# Patient Record
Sex: FEMALE | Race: BLACK OR AFRICAN AMERICAN
[De-identification: names, ages, dates, MRNs, and addresses within clinical notes are randomized per-mention and may not be internally consistent; named-entity substitution may affect disease eponyms.]

---

## 2017-07-28 ENCOUNTER — HOSPITAL ENCOUNTER (EMERGENCY)
Dept: HOSPITAL 74 - JER | Age: 27
LOS: 1 days | Discharge: HOME | End: 2017-07-29
Payer: COMMERCIAL

## 2017-07-28 VITALS — TEMPERATURE: 98.3 F | DIASTOLIC BLOOD PRESSURE: 58 MMHG | SYSTOLIC BLOOD PRESSURE: 132 MMHG | HEART RATE: 86 BPM

## 2017-07-28 VITALS — BODY MASS INDEX: 32.9 KG/M2

## 2017-07-28 DIAGNOSIS — Z3A.01: ICD-10-CM

## 2017-07-28 DIAGNOSIS — O26.891: Primary | ICD-10-CM

## 2017-07-28 NOTE — PDOC
History of Present Illness





- General


Chief Complaint: Pain


Stated Complaint: CRAMPING/6 WKS PREGNANT


Time Seen by Provider: 17 22:14





- History of Present Illness


Initial Comments: 


 27 year old  female presenting with recent serum pregnancy positive at her 

ObGyn and new abdominal cramps. She had cramps on Wednesday (17)  and 

called into the ED at which point she was instructed to wait to see if the 

cramps would resolve on their own at which point they did until today when she 

had more cramping. She denies vaginal bleeding or any other symptoms. Her LMP 

was 6/15/17. Her previous pregnancy did not co-present with cramps and was only 

notable for need for early induction due to amniotic fluid leakage. The baby 

had a normal developmental history and is currently healthy. She has not had an 

abdominal or trans-vaginal ultrasound.





17 00:49








Past History





- Past Medical History


Allergies/Adverse Reactions: 


 Allergies











Allergy/AdvReac Type Severity Reaction Status Date / Time


 


No Known Allergies Allergy   Verified 17 22:02











Home Medications: 


Ambulatory Orders





NK [No Known Home Medication]  17 








Asthma: No


Cancer: No


Cardiac Disorders: No


Diabetes: No


HTN: No


Seizures: No


Thyroid Disease: No


Other medical history: Pt denies





- Psycho/Social/Smoking Cessation Hx


Suicidal Ideation: No


Smoking Status: No


Smoking History: Never smoked


Number of Cigarettes Smoked Daily: 0


Information on smoking cessation initiated: No


Hx Alcohol Use: No


Drug/Substance Use Hx: No


Substance Use Type: None


Hx Substance Use Treatment: No





**Review of Systems





- Review of Systems


Constitutional: No: Chills, Diaphoresis, Fever, Loss of Appetite


HEENTM: No: Blurred Vision, Double Vision


Respiratory: No: Cough, Orthopnea, Shortness of Breath


Cardiac (ROS): Yes: Other (breast pain).  No: Chest Pain


ABD/GI: Yes: Abdominal Distended, Constipated, Nausea, Abdominal cramping.  No: 

Diarrhea, Poor Appetite, Vomiting, Indigestion


: Yes: Other (Pelvic cramping).  No: Burning, Dysuria, Discharge


Integumentary: Yes: Sweating.  No: Dryness, Erythema, Flushing


Neurological: No: Headache, Numbness, Paresthesia





*Physical Exam





- Vital Signs


 Last Vital Signs











Temp Pulse Resp BP Pulse Ox


 


 98.3 F   86   18   132/58   100 


 


 17 22:02  17 22:02  17 22:02  17 22:02  17 22:02














- Physical Exam


General Appearance: Yes: Appropriately Dressed.  No: Nourished, Apparent 

Distress


HEENT: positive: EOMI, MYCHAL, Normal Voice


Neck: positive: Trachea midline, Normal Thyroid, Supple.  negative: Tender, 

Rigid


Respiratory/Chest: positive: Lungs Clear, Normal Breath Sounds.  negative: 

Chest Tender, Respiratory Distress, Accessory Muscle Use


Cardiovascular: positive: Regular Rhythm, Regular Rate, S1, S2.  negative: 

Murmur


Female Pelvic Exam: positive: normal external exam, cervical os closed, normal 

size ovaries.  negative: normal adnexa (Adnexal tenderness bilaterally), 

discharge, lesions


Gastrointestinal/Abdominal: positive: Normal Bowel Sounds, Tender (Pelvic 

tenderness), Soft, Protuberent, Distended.  negative: Flat, Organomegaly


Musculoskeletal: positive: Normal Inspection


Integumentary: positive: Normal Color, Dry, Warm


Neurologic: positive: Fully Oriented, Alert, Normal Mood/Affect





ED Treatment Course





- LABORATORY


CBC & Chemistry Diagram: 


 17 00:00





 17 00:00





Medical Decision Making





- Medical Decision Making


27 year old female  presenting with isolated abdominal cramping on and off 

for the past 4 days. Will get trans-vaginal ultrasound and quantitative hcg for 

correlation.


17 01:02








17 02:12


Pelvic exam revealed normal appearing external and internal vagina with closed 

cervical os and no discharge. There was some bilateral adnexal tenderness. TV 

US demonstrated gestational sack but no fetal heartbeat noted. BhCG was 3K. 

Patient says she has an OBGyn appointment on Monday. After discussion with the 

patient she was told to make sure to keep her appointment or return to the ED 

if there was an issue. 





*DC/Admit/Observation/Transfer


Diagnosis at time of Disposition: 


 Inlet contraction of pelvis





- Discharge Dispostion


Disposition: HOME


Condition at time of disposition: Improved


Admit: No





- Patient Instructions


Printed Discharge Instructions:  DI for Resolved  Contractions


Additional Instructions: 


YOU were seen for contractions early on in your pregnancy. We did a pelvic exam 

and ultrasound which showed a possible pregnancy but we could not see a heart 

beat so we need you to return in approximately 48 hours or follow up with your 

obgyn physician in order to have another ultrasound performed. Please return 

earlier if you have worsening contractions, vaginal bleeding, or other 

concerning symptoms. 





- Attestations


Physician Attestion: 








I, Dr. Wilfrido Wilks, attest that this document has been prepared under my 

direction and personally reviewed by me in its entirety.   I further attest, 

that it accurately reflects all work, treatment, procedures and medical decision

-making performed by me.  


17 02:18

## 2017-07-28 NOTE — PDOC
Attending Attestation





- Resident


Resident Name: Wilfrido Wilks





- ED Attending Attestation


I have performed the following: I have examined & evaluated the patient, The 

case was reviewed & discussed with the resident, I agree w/resident's findings 

& plan, Exceptions are as noted





- HPI


HPI: 


26 yo  presents with pelvic discomfort. She states her last period was 

about 6 weeks ago. Denies dysuria, f/c, N/V/D. She has not yet seen her gyn for 

this pregnancy, and has not had an ultrasound yet. Denies bleeding. 











- Physicial Exam


PE: 


GENERAL: Awake, alert, and fully oriented, in no acute distress


HEAD: No signs of trauma


EYES: PERRLA, EOMI, sclera anicteric, conjunctiva clear


ENT: Auricles normal inspection, hearing grossly normal, nares patent, 

oropharynx clear without exudates. Moist mucosa


NECK: Normal ROM, supple, no lymphadenopathy, JVD, or masses


LUNGS: Breath sounds equal, clear to auscultation bilaterally.  No wheezes, and 

no crackles


HEART: Regular rate and rhythm, normal S1 and S2, no murmurs, rubs or gallops


ABDOMEN: Soft, nontender, normoactive bowel sounds.  No guarding, no rebound.  

No masses


EXTREMITIES: Normal range of motion, no edema.  No clubbing or cyanosis. No 

cords, erythema, or tenderness


NEUROLOGICAL: Cranial nerves II through XII grossly intact.  Normal speech, 

normal gait


SKIN: Warm, Dry, normal turgor, no rashes or lesions noted.


: No external lesions. +Physiologic discharge. +B/L adnexal tenderness.








- Medical Decision Making


26 yo F presents with pelvic pain, no vag bleeding. Will obtain labs to 

establish B-HCG, then obtain sono to evaluate the pregnancy.

## 2017-07-29 LAB
ALBUMIN SERPL-MCNC: 3.9 G/DL (ref 3.4–5)
ALP SERPL-CCNC: 69 U/L (ref 45–117)
ALT SERPL-CCNC: 39 U/L (ref 12–78)
ANION GAP SERPL CALC-SCNC: 10 MMOL/L (ref 8–16)
APPEARANCE UR: CLEAR
AST SERPL-CCNC: 33 U/L (ref 15–37)
BASOPHILS # BLD: 0.5 % (ref 0–2)
BILIRUB SERPL-MCNC: 0.5 MG/DL (ref 0.2–1)
BILIRUB UR STRIP.AUTO-MCNC: NEGATIVE MG/DL
CALCIUM SERPL-MCNC: 9.4 MG/DL (ref 8.5–10.1)
CO2 SERPL-SCNC: 26 MMOL/L (ref 21–32)
COLOR UR: (no result)
CREAT SERPL-MCNC: 0.6 MG/DL (ref 0.55–1.02)
DEPRECATED RDW RBC AUTO: 14.3 % (ref 11.6–15.6)
EOSINOPHIL # BLD: 1.4 % (ref 0–4.5)
GLUCOSE SERPL-MCNC: 71 MG/DL (ref 74–106)
KETONES UR QL STRIP: NEGATIVE
LEUKOCYTE ESTERASE UR QL STRIP.AUTO: NEGATIVE
MCH RBC QN AUTO: 25.8 PG (ref 25.7–33.7)
MCHC RBC AUTO-ENTMCNC: 32 G/DL (ref 32–36)
MCV RBC: 80.6 FL (ref 80–96)
NEUTROPHILS # BLD: 60.4 % (ref 42.8–82.8)
NITRITE UR QL STRIP: NEGATIVE
PH UR: 6 [PH] (ref 5–8)
PLATELET # BLD AUTO: 292 K/MM3 (ref 134–434)
PMV BLD: 9.3 FL (ref 7.5–11.1)
PROT SERPL-MCNC: 7.5 G/DL (ref 6.4–8.2)
PROT UR QL STRIP: NEGATIVE
PROT UR QL STRIP: NEGATIVE
RBC # BLD AUTO: <1 /HPF (ref 0–3)
RBC # UR STRIP: (no result) /UL
SP GR UR: 1.01 (ref 1–1.02)
UROBILINOGEN UR STRIP-MCNC: NEGATIVE MG/DL (ref 0.2–1)
WBC # BLD AUTO: 13.8 K/MM3 (ref 4–10)
WBC # UR AUTO: <1 /HPF (ref 3–5)

## 2017-08-16 ENCOUNTER — HOSPITAL ENCOUNTER (EMERGENCY)
Dept: HOSPITAL 74 - JER | Age: 27
LOS: 1 days | Discharge: HOME | End: 2017-08-17
Payer: COMMERCIAL

## 2017-08-16 VITALS — DIASTOLIC BLOOD PRESSURE: 72 MMHG | HEART RATE: 76 BPM | SYSTOLIC BLOOD PRESSURE: 122 MMHG | TEMPERATURE: 98.1 F

## 2017-08-16 VITALS — BODY MASS INDEX: 33 KG/M2

## 2017-08-16 DIAGNOSIS — O26.891: Primary | ICD-10-CM

## 2017-08-16 DIAGNOSIS — Z3A.08: ICD-10-CM

## 2017-08-16 DIAGNOSIS — O03.9: ICD-10-CM

## 2017-08-16 LAB
ALBUMIN SERPL-MCNC: 3.7 G/DL (ref 3.4–5)
ALP SERPL-CCNC: 69 U/L (ref 45–117)
ALT SERPL-CCNC: 29 U/L (ref 12–78)
ANION GAP SERPL CALC-SCNC: 7 MMOL/L (ref 8–16)
AST SERPL-CCNC: 23 U/L (ref 15–37)
BASOPHILS # BLD: 0.6 % (ref 0–2)
BILIRUB SERPL-MCNC: 0.4 MG/DL (ref 0.2–1)
CALCIUM SERPL-MCNC: 9.6 MG/DL (ref 8.5–10.1)
CO2 SERPL-SCNC: 26 MMOL/L (ref 21–32)
CREAT SERPL-MCNC: 0.7 MG/DL (ref 0.55–1.02)
DEPRECATED RDW RBC AUTO: 14.2 % (ref 11.6–15.6)
EOSINOPHIL # BLD: 1.7 % (ref 0–4.5)
GLUCOSE SERPL-MCNC: 96 MG/DL (ref 74–106)
MCH RBC QN AUTO: 26 PG (ref 25.7–33.7)
MCHC RBC AUTO-ENTMCNC: 32.9 G/DL (ref 32–36)
MCV RBC: 79 FL (ref 80–96)
NEUTROPHILS # BLD: 62.7 % (ref 42.8–82.8)
PLATELET # BLD AUTO: 334 K/MM3 (ref 134–434)
PMV BLD: 8.5 FL (ref 7.5–11.1)
PROT SERPL-MCNC: 7.3 G/DL (ref 6.4–8.2)
WBC # BLD AUTO: 14.3 K/MM3 (ref 4–10)

## 2017-08-16 NOTE — PDOC
History of Present Illness





- General


History Source: Patient


Exam Limitations: No Limitations





- History of Present Illness


Initial Comments: 





17 21:54


The patient is a 27 year old female (), 8 Weeks pregnant, who presents to 

the ED with complaints of abdominal cramping and vaginal bleeding since earlier 

today. The patient reports she noted brown spotting this morning. She states 

the vaginal bleeding progressively worsened throughout the day to light pink 

spotting and she now sees bright red blood secondary to wiping. Patient also 

reports lower abdominal cramping associated with present symptoms. 


Denies dysuria. Denies fever or chills. Denies nausea, vomiting, or diarrhea. 

Denies any other symptoms. 








<Elma Zaragoza - Last Filed: 17 00:19>





- General


History Source: Patient (ENT no chris fundal ten)





<Reza Garcia - Last Filed: 17 00:28>





- General


Chief Complaint: Vaginal Bleeding


Stated Complaint: VAGINAL BLEEDING


Time Seen by Provider: 17 21:42





Past History





<Elma Zaragoza - Last Filed: 17 00:19>





- Past Medical History


Asthma: No


Cancer: No


Cardiac Disorders: No


Diabetes: No


HTN: No


Seizures: No


Thyroid Disease: No





- Psycho/Social/Smoking Cessation Hx


Suicidal Ideation: No


Smoking Status: No


Smoking History: Never smoked


Have you smoked in the past 12 months: No


Number of Cigarettes Smoked Daily: 0


Information on smoking cessation initiated: No


Hx Alcohol Use: No


Drug/Substance Use Hx: No


Substance Use Type: None


Hx Substance Use Treatment: No





<Reza Garcia - Last Filed: 17 00:28>





- Past Medical History


Allergies/Adverse Reactions: 


 Allergies











Allergy/AdvReac Type Severity Reaction Status Date / Time


 


No Known Allergies Allergy   Verified 17 21:21











Home Medications: 


Ambulatory Orders





NK [No Known Home Medication]  17 











**Review of Systems





- Review of Systems


Able to Perform ROS?: Yes


Comments:: 





17 21:55








CONSTITUTIONAL:


No reported: Fever, Chills, Diaphoresis, Generalized Weakness, Malaise, Loss of 

Appetite


HEENT:


No reported: Rhinorrhea, Nasal Congestion, Throat Pain, Throat Swelling, 

Difficulty Swallowing, Mouth Swelling, Ear Pain, Eye Pain, Visual Changes


CARDIOVASCULAR:


No reported: Chest Pain, Syncope, Palpitations, Irregular Heart Rate, 

Lightheadedness, Peripheral Edema


RESPIRATORY:


No reported: Cough, Shortness of Breath, SOB with Exertion, Orthopnea, Wheezing

, Stridor, Hemoptysis


GASTROINTESTINAL: + abdominal cramping 


No reported: Abdominal Distension, Nausea, Vomiting, Diarrhea, Constipation, 

Melena, Hematochezia


GENITOURINARY: + vaginal bleeding 


No reported: Dysuria, Frequency, Urgency, Hesitancy, Flank Pain, Genital Pain


MUSCULOSKELETAL:


No reported: Myalgia, Arthralgia, Joint Swelling, Back pain, Neck Pain


SKIN:


No reported: Rash, Itching, Pallor


HEMEATOLOGIC/IMMUNOLOGIC:


No reported: Easy Bleeding, Easy Bruising, Lymphadenopathy, Frequent infections


ENDOCRINE:


No reported: Unexplained Weight Gain, Unexplained Weight Loss, Heat Intolerance

, Cold Intolerance


NEUROLOGIC:


No reported: Headache, Focal Weakness, Paresthesias, Vertigo, Lightheadedness, 

Unsteady Gait, Seizure, Mental Status Changes, Incontinence


PSYCHIATRIC:


No reported: Anxiety, Depression 





All Other Systems: Reviewed and Negative





<Elma Zaragoza - Last Filed: 17 00:19>





*Physical Exam





- Vital Signs


 Last Vital Signs











Temp Pulse Resp BP Pulse Ox


 


 98.1 F   76   18   122/72   100 


 


 17 21:18  17 21:18  17 21:18  17 21:18  17 21:18














- Physical Exam


Comments: 





17 21:55





GENERAL:


Well developed, well nourished. Awake and alert. No acute distress.


HEENT:


Normocephalic, atraumatic. PERRLA, EOMI. No conjunctival pallor. Sclera are non-

icteric. Moist mucous membranes. Oropharynx is clear.


NECK: 


Supple. Full ROM. No JVD. Carotid pulses 2+ and symmetric, without bruits. No 

thyromegaly. No lymphadenopathy.


CARDIOVASCULAR:


Regular rate and rhythm. No murmurs, rubs, or gallops. Distal pulses are 2+ and 

symmetric. 


PULMONARY: 


No evidence of respiratory distress. Lungs clear to auscultation bilaterally. 

No wheezing, rales or rhonchi.


ABDOMINAL:


Soft. Non-tender. Non-distended. No rebound or guarding. No organomegaly. 

Normoactive bowel sounds.


PELVIC: 


+ scant brown blood in the vault. No CMT, no masses, no vaginal lesions.  


MUSCULOSKELETAL 


Normal range of motion at all joints. No bony deformities or tenderness. No CVA 

tenderness.


EXTREMITIES: 


No cyanosis. No clubbing. No edema. No calf tenderness.


SKIN: 


Warm and dry. Normal capillary refill. No rashes. No jaundice. 


NEUROLOGICAL: 


Alert, awake, appropriate. Cranial nerves 2-12 intact. No deficits to light 

touch and temperature in face, upper extremities and lower extremities. No 

motor deficits in the in face, upper extremities and lower extremities. 

Normoreflexic in the upper and lower extremities. Normal speech. Toes are down-

going bilaterally. Gait is normal without ataxia.


PSYCHIATRIC: 


Cooperative. Good eye contact. Appropriate mood and affect.








<Elma Zaragoza - Last Filed: 17 00:19>





- Vital Signs


 Last Vital Signs











Temp Pulse Resp BP Pulse Ox


 


 98.1 F   76   18   122/72   100 


 


 17 21:18  17 21:18  17 21:18  17 21:18  17 21:18














<Reza Garcia - Last Filed: 17 00:28>





ED Treatment Course





- LABORATORY


CBC & Chemistry Diagram: 


 17 21:44





 17 21:44





- RADIOLOGY


Radiograph Interpretation: 





17 00:19


EXAM: Obstetric ultrasound first trimester 


FINDINGS: There is an intrauterine gestational sac with irregular margins. 

Measurements correspond to 5 weeks 3 days. No fetal pole is visualized. This 

may represent a failed gestation. Followup recommended to exclude the less 

likely possibility of early normal intrauterine gestation in which the fetal 

pole is not yet visualized. There is some fluid in the cervical canal.  1.4 cm 

right ovarian cyst likely luteal.  1.3 cm left ovarian cyst.  Some free fluid 

in the pelvic cul-de-sac.


Reported by: Imaging on call 





<Elma Zaragoza - Last Filed: 17 00:19>





- LABORATORY


CBC & Chemistry Diagram: 


 17 21:44





 17 21:44





<Reza Garcia - Last Filed: 17 00:28>





Medical Decision Making





- Medical Decision Making





17 00:26


Dr. Garcia: The scribe's documentation has been prepared under my direction 

and personally reviewed by me in its entirery. I confirm that the note above 

accurately reflects all work, treatment, procedures, and medical decision 

making performed by me.





<Reza Garcia - Last Filed: 17 00:28>





*DC/Admit/Observation/Transfer





- Attestations


Scribe Attestion: 





17 21:55





Documentation prepared by Elma aZragoza, acting as medical scribe for Reza Garcia MD





<Elma Zaragoza - Last Filed: 17 00:19>





- Discharge Dispostion


Admit: No





<Reza Garcia - Last Filed: 17 00:28>


Diagnosis at time of Disposition: 


 Inevitable 





- Discharge Dispostion


Disposition: HOME


Condition at time of disposition: Stable





- Referrals


Referrals: 


Charles Urrutia MD [Primary Care Provider] - 


Wolfgang Gage MD [Staff Physician] - 





- Patient Instructions


Printed Discharge Instructions:  DI for Miscarriage, DI for Threatened 


Additional Instructions: 


PLease follow up with your Ob/gyn to follow up the progress of the pregnancy.

## 2018-04-30 ENCOUNTER — HOSPITAL ENCOUNTER (EMERGENCY)
Dept: HOSPITAL 74 - JER | Age: 28
Discharge: HOME | End: 2018-04-30
Payer: COMMERCIAL

## 2018-04-30 VITALS — TEMPERATURE: 98.3 F | SYSTOLIC BLOOD PRESSURE: 116 MMHG | DIASTOLIC BLOOD PRESSURE: 70 MMHG | HEART RATE: 81 BPM

## 2018-04-30 VITALS — BODY MASS INDEX: 31.8 KG/M2

## 2018-04-30 DIAGNOSIS — K05.10: ICD-10-CM

## 2018-04-30 DIAGNOSIS — D57.3: ICD-10-CM

## 2018-04-30 DIAGNOSIS — E28.2: ICD-10-CM

## 2018-04-30 DIAGNOSIS — R07.89: Primary | ICD-10-CM

## 2018-04-30 LAB
ALBUMIN SERPL-MCNC: 3.8 G/DL (ref 3.4–5)
ALP SERPL-CCNC: 72 U/L (ref 45–117)
ALT SERPL-CCNC: 29 U/L (ref 12–78)
ANION GAP SERPL CALC-SCNC: 5 MMOL/L (ref 8–16)
APPEARANCE UR: (no result)
AST SERPL-CCNC: 19 U/L (ref 15–37)
BASOPHILS # BLD: 0.7 % (ref 0–2)
BILIRUB SERPL-MCNC: 0.6 MG/DL (ref 0.2–1)
BILIRUB UR STRIP.AUTO-MCNC: NEGATIVE MG/DL
BUN SERPL-MCNC: 10 MG/DL (ref 7–18)
CALCIUM SERPL-MCNC: 8.9 MG/DL (ref 8.5–10.1)
CHLORIDE SERPL-SCNC: 107 MMOL/L (ref 98–107)
CO2 SERPL-SCNC: 26 MMOL/L (ref 21–32)
COLOR UR: (no result)
CREAT SERPL-MCNC: 0.7 MG/DL (ref 0.55–1.02)
DEPRECATED RDW RBC AUTO: 14.1 % (ref 11.6–15.6)
EOSINOPHIL # BLD: 2.2 % (ref 0–4.5)
EPITH CASTS URNS QL MICRO: (no result) /HPF
GLUCOSE SERPL-MCNC: 89 MG/DL (ref 74–106)
HCT VFR BLD CALC: 33.8 % (ref 32.4–45.2)
HGB BLD-MCNC: 11.5 GM/DL (ref 10.7–15.3)
INR BLD: 1.11 (ref 0.82–1.09)
KETONES UR QL STRIP: NEGATIVE
LEUKOCYTE ESTERASE UR QL STRIP.AUTO: NEGATIVE
LYMPHOCYTES # BLD: 31.6 % (ref 8–40)
MCH RBC QN AUTO: 26.6 PG (ref 25.7–33.7)
MCHC RBC AUTO-ENTMCNC: 34.1 G/DL (ref 32–36)
MCV RBC: 77.9 FL (ref 80–96)
MONOCYTES # BLD AUTO: 7.8 % (ref 3.8–10.2)
MUCOUS THREADS URNS QL MICRO: (no result)
NEUTROPHILS # BLD: 57.7 % (ref 42.8–82.8)
NITRITE UR QL STRIP: NEGATIVE
PH UR: 6 [PH] (ref 5–8)
PLATELET # BLD AUTO: 320 K/MM3 (ref 134–434)
PMV BLD: 8.9 FL (ref 7.5–11.1)
POTASSIUM SERPLBLD-SCNC: 3.9 MMOL/L (ref 3.5–5.1)
PROT SERPL-MCNC: 7.2 G/DL (ref 6.4–8.2)
PROT UR QL STRIP: NEGATIVE
PROT UR QL STRIP: NEGATIVE
PT PNL PPP: 12.5 SEC (ref 9.7–13)
RBC # BLD AUTO: 4.33 M/MM3 (ref 3.6–5.2)
SODIUM SERPL-SCNC: 138 MMOL/L (ref 136–145)
SP GR UR: 1.01 (ref 1–1.03)
UROBILINOGEN UR STRIP-MCNC: 2 MG/DL (ref 0.2–1)
WBC # BLD AUTO: 9.5 K/MM3 (ref 4–10)

## 2018-04-30 PROCEDURE — 3E033GC INTRODUCTION OF OTHER THERAPEUTIC SUBSTANCE INTO PERIPHERAL VEIN, PERCUTANEOUS APPROACH: ICD-10-PCS

## 2018-04-30 NOTE — PDOC
History of Present Illness





- General


Chief Complaint: Chest Pain


Stated Complaint: CHEST PAIN


Time Seen by Provider: 04/30/18 19:50


History Source: Patient


Exam Limitations: No Limitations





- History of Present Illness


Initial Comments: 





04/30/18 20:32


Pt is a 27 yo F with PMHx of polycystic ovary disease, gum dx, SC trait, with 

recent normal Stress test, and recent miscarriage presenting with intermittent 

chest pain since 5.30pm. The pain is intermittent, described as a 10/10 

retrosternal chest tightness, radiating to the neck and throat that was first 

noticed while lying down. The pain came on after a meal. Not related to exertion

, not improved by sitting up, or ingestion of milk. Pt describes SOB when 

climbing stairs, not related to the chest pain. No family hx of early CAD 

death. Mother also recently had a stress test. Pt has had dyspepsia in the past 

that resolved with ingestion of milk. Pt thinks this pain is different from the 

dyspepsia she has had in the past. No fever, cough, syncope, weakness of any 

part of the body or blurring of vision. She noted suprapubic pain, no hematuria 

or dysuria. 





04/30/18 20:42





Timing/Duration: intermittent


Severity: moderate


Associated Symptoms: denies: cough, diaphoresis, fever/chills, loss of appetite

, nausea/vomiting, shortness of breath, syncope, weakness





Past History





- Past Medical History


Allergies/Adverse Reactions: 


 Allergies











Allergy/AdvReac Type Severity Reaction Status Date / Time


 


No Known Allergies Allergy   Verified 04/30/18 19:35











Home Medications: 


Ambulatory Orders





NK [No Known Home Medication]  07/29/17 








Asthma: No


Cancer: No


Cardiac Disorders: No


COPD: No


Diabetes: No


HTN: No


Seizures: No


Thyroid Disease: No





- Reproductive History


LMP comment: 4/17/18





- Immunization History


Immunization Up to Date: Yes





- Suicide/Smoking/Psychosocial Hx


Smoking Status: No


Smoking History: Never smoked


Have you smoked in the past 12 months: No


Number of Cigarettes Smoked Daily: 0


Information on smoking cessation initiated: No


Hx Alcohol Use: No


Drug/Substance Use Hx: No


Substance Use Type: None


Hx Substance Use Treatment: No





**Review of Systems





- Review of Systems


Constitutional: No: Chills, Diaphoresis, Fever, Night Sweats, Weakness


HEENTM: No: Blurred Vision, Throat Pain


Respiratory: Yes: Shortness of Breath (On climbing stairs- negative stress test 

resulted last week).  No: Cough, Orthopnea


Cardiac (ROS): Yes: Chest Tightness.  No: Edema, Lightheadedness, Palpitations, 

Syncope


ABD/GI: No: Abdominal Distended, Nausea, Vomiting


: Yes: Pain (lower abdominal pain).  No: Burning, Dysuria


Musculoskeletal: No: Back Pain, Muscle Pain


Neurological: No: Headache, Numbness, Paresthesia, Seizure, Tremors, Weakness, 

Dizziness





*Physical Exam





- Vital Signs


 Last Vital Signs











Temp Pulse Resp BP Pulse Ox


 


 98.3 F   81   16   116/70   100 


 


 04/30/18 19:32  04/30/18 19:32  04/30/18 19:32  04/30/18 19:32  04/30/18 19:32














- Physical Exam


General Appearance: Yes: Appropriately Dressed.  No: Apparent Distress


HEENT: positive: EOMI, MYCHAL.  negative: Scleral Icterus (L), Pharyngeal Erythema


Neck: positive: Supple.  negative: Tender


Respiratory/Chest: positive: Lungs Clear, Normal Breath Sounds.  negative: 

Respiratory Distress, Accessory Muscle Use, Labored Respiration


Cardiovascular: positive: S1, S2


Gastrointestinal/Abdominal: positive: Soft.  negative: Tender, Distended, 

Guarding


Musculoskeletal: negative: CVA Tenderness


Extremity: positive: Normal Inspection.  negative: Delayed Capillary Refill


Integumentary: positive: Dry, Warm


Neurologic: positive: Fully Oriented, Alert, Motor Strength 5/5.  negative: EOM 

Palsy, Facial Droop, Numbness, Confused, Disoriented





**Heart Score/ECG Review





- Age


Age: </= 45





- Risk Factors


Risk Factors Heart Score: Yes Smoking History, Yes Hx Obesity





- ECG Intrepretation


Rhythm: Regular Rhythm





- Axis


Axis: Normal





- P and MI


Delta Wave(s) Present: No


WPW: No





- QRS


Poor R Wave Progression: No


Q Wave Present: No





- ST and T


Early Repolarization: No


Non Specific ST-T Wave changes: Yes


Flattened T Waves: No


Prolonged Q-T Interval: No





- ECG Impressions


Comment:: 





04/30/18 21:29


Ventricular rate 82bpm, Normal sinus rhythm, possible L atrial enlargement


QT/QTc- 380/443





ED Treatment Course





- LABORATORY


CBC & Chemistry Diagram: 


 04/30/18 19:25





 04/30/18 21:32





Medical Decision Making





- Medical Decision Making





04/30/18 21:31


Chest pain is atypical, not worsened with exertion, no associated diaphoresis, 

or  radiation


Normal appearing EKG, no previous to compare with


With recent normal stress test, ACS is unlikely but will go ahead and do trops, 

CBC, CMP, Mg, UA, beta hcg, coags


04/30/18 21:34


UA- negative for UTI


WBC- wnl, H&H - 11 within normal for her with microcytic picture consistent 

with sickle cell trait history


Pending hcg


Would put in for chest xray- patient reported pain radiating to back


04/30/18 23:00


Trops-ve


CXR- no evidence of dissection, no unfolding of the aorta


Plan is to discharge home to follow up with PCP as an outpatient








*DC/Admit/Observation/Transfer


Diagnosis at time of Disposition: 


 Atypical chest pain








- Discharge Dispostion


Disposition: HOME


Condition at time of disposition: Stable


Admit: No





- Referrals


Referrals: 


Charles Urrutia MD [Primary Care Provider] - 





- Patient Instructions


Additional Instructions: 


You were seen for tightness in your chest


We did an EKG and blood tests, Chest Xray  that did not show evidence of a 

heart attack or problems with your aorta


We gave you some medication to ease the chest tightness


Please follow up with your primary doctor in a week's time


If you feel your symptoms are getting worse, and you are unable to breath, 

please return to the Emergency room





- Post Discharge Activity





- Attestations


Physician Attestion: 





04/30/18 23:06


Mari Geiger MD

## 2018-04-30 NOTE — PDOC
Attending Attestation





- Resident


Resident Name: Mari Geiger





- ED Attending Attestation


I have performed the following: I have examined & evaluated the patient, The 

case was reviewed & discussed with the resident, I agree w/resident's findings 

& plan, Exceptions are as noted





- HPI


HPI: 





04/30/18 21:11


27 yo female who came with chest tightness


-she had a recent stress echo on April 2 that was normal 


04/30/18 21:13


she does have a history of GERD 





- Physicial Exam


PE: 





04/30/18 21:14


27 yo female in no acute distress


head -ncat


neck -no jvd,no bruits


lungs -cta b/l


cvs- [id number] no rubs,murmurs or gallops


abd -soft,nontender


ext -no edema


muscularskeletal -no cva tenderness


neuro axox3,ambulating with ease


skin warm and dry


psych [id number]





- Medical Decision Making





04/30/18 22:41


troponin pending


04/30/18 23:03


negative troponin


ekg is nsr with no signs of ischemia


04/30/18 23:06


cxr normal medistinum


04/30/18 23:14








IMP GERD, pt encouraged to continue her PPI

## 2018-05-02 NOTE — EKG
Test Reason : 

Blood Pressure : ***/*** mmHG

Vent. Rate : 082 BPM     Atrial Rate : 082 BPM

   P-R Int : 180 ms          QRS Dur : 080 ms

    QT Int : 380 ms       P-R-T Axes : 045 063 040 degrees

   QTc Int : 443 ms

 

NORMAL SINUS RHYTHM

POSSIBLE LEFT ATRIAL ENLARGEMENT

CANNOT RULE OUT ANTERIOR INFARCT , AGE UNDETERMINED

ABNORMAL ECG

NO PREVIOUS ECGS AVAILABLE

Confirmed by MARY GARCIA MD ([id number]) on 5/2/2018 10:35:53 AM

 

Referred By:             Confirmed By:MARY GARCIA MD

## 2019-04-28 ENCOUNTER — HOSPITAL ENCOUNTER (INPATIENT)
Dept: HOSPITAL 74 - JLDR | Age: 29
LOS: 2 days | Discharge: HOME | DRG: 560 | End: 2019-04-30
Attending: OBSTETRICS & GYNECOLOGY | Admitting: OBSTETRICS & GYNECOLOGY
Payer: COMMERCIAL

## 2019-04-28 VITALS — BODY MASS INDEX: 34.2 KG/M2

## 2019-04-28 DIAGNOSIS — Z3A.37: ICD-10-CM

## 2019-04-28 DIAGNOSIS — D57.3: ICD-10-CM

## 2019-04-28 DIAGNOSIS — O34.211: Primary | ICD-10-CM

## 2019-04-28 DIAGNOSIS — E28.2: ICD-10-CM

## 2019-04-28 DIAGNOSIS — O99.013: ICD-10-CM

## 2019-04-28 DIAGNOSIS — O99.283: ICD-10-CM

## 2019-04-28 LAB
ANION GAP SERPL CALC-SCNC: 8 MMOL/L (ref 8–16)
APTT BLD: 29.2 SECONDS (ref 25.2–36.5)
BASOPHILS # BLD: 0.3 % (ref 0–2)
BUN SERPL-MCNC: 4 MG/DL (ref 7–18)
CALCIUM SERPL-MCNC: 9.6 MG/DL (ref 8.5–10.1)
CHLORIDE SERPL-SCNC: 104 MMOL/L (ref 98–107)
CO2 SERPL-SCNC: 23 MMOL/L (ref 21–32)
CREAT SERPL-MCNC: 0.5 MG/DL (ref 0.55–1.3)
DEPRECATED RDW RBC AUTO: 15.6 % (ref 11.6–15.6)
EOSINOPHIL # BLD: 0.4 % (ref 0–4.5)
GLUCOSE SERPL-MCNC: 85 MG/DL (ref 74–106)
HCT VFR BLD CALC: 38.2 % (ref 32.4–45.2)
HGB BLD-MCNC: 12.5 GM/DL (ref 10.7–15.3)
INR BLD: 0.97 (ref 0.83–1.09)
LYMPHOCYTES # BLD: 14.5 % (ref 8–40)
MCH RBC QN AUTO: 26.1 PG (ref 25.7–33.7)
MCHC RBC AUTO-ENTMCNC: 32.6 G/DL (ref 32–36)
MCV RBC: 80.1 FL (ref 80–96)
MONOCYTES # BLD AUTO: 9.6 % (ref 3.8–10.2)
NEUTROPHILS # BLD: 75.2 % (ref 42.8–82.8)
PLATELET # BLD AUTO: 211 K/MM3 (ref 134–434)
PMV BLD: 8.9 FL (ref 7.5–11.1)
POTASSIUM SERPLBLD-SCNC: 4.2 MMOL/L (ref 3.5–5.1)
PT PNL PPP: 11.5 SEC (ref 9.7–13)
RBC # BLD AUTO: 4.77 M/MM3 (ref 3.6–5.2)
SODIUM SERPL-SCNC: 135 MMOL/L (ref 136–145)
WBC # BLD AUTO: 11.5 K/MM3 (ref 4–10)

## 2019-04-28 RX ADMIN — BUPIVACAINE HYDROCHLORIDE SCH ML: 7.5 INJECTION, SOLUTION EPIDURAL; RETROBULBAR at 12:05

## 2019-04-28 RX ADMIN — IBUPROFEN PRN MG: 600 TABLET, FILM COATED ORAL at 18:45

## 2019-04-28 NOTE — PN
Ante-Partal Exam





- Subjective


Subjective: 





Pt feeling lower abdominal pain. 


Vital Signs: 


 Vital Signs











Temperature  98.2 F   04/28/19 16:00


 


Pulse Rate  126 H  04/28/19 16:15


 


Respiratory Rate  20   04/28/19 16:15


 


Blood Pressure  150/82   04/28/19 16:15


 


O2 Sat by Pulse Oximetry (%)  99   04/28/19 16:15











Bleeding: Yes


Bleeding Description: Mild


Headache: No


Visual changes: No


Right upper quadrant pain: No


Pain (scale 1-10): 3





- Contractions


Contractions: Yes


Regularity: Regular





- Exam during Labor


Fetal Heart Rate: 155


Variability: Moderate


Category: I


Fetal Monitor Accelerations: Absent


Fetal Monitor Decelerations: None


Exam: Vaginal


Dilatation (cm): 9.5


Effacement (%): 100


Amniotic Membrane Status: Ruptured


Fetal Presentation: Vertex


Fetal Station: 0





- Assessment/Plan


Assessment/Plan: 





to begin pushing

## 2019-04-28 NOTE — LDN
Oxytocin Pre-Use Checklist


Date and Time completed: 


19  0285





Physician order on chart: Yes


Current history and physical on chart: Yes


Indication for induction is documented: Yes


Prenatal record on chart: Yes


Pelvis is documented by physician to be clinically adequate: Yes


Estimated fetal weight within past week (clinical or sono): Less than 4500 

grams in a non-diabetic woman


Gestational age is documented: Yes


Consent signed: Yes


Physician with  privileges: is aware of the induction


Status of the cervix is assessed and documented: Yes


Presentation is assessed and documented: Yes

## 2019-04-28 NOTE — PN
Ante-Partal Exam





- Subjective


Subjective: 





Pt feeling some contraction pain.  


Vital Signs: 


 Vital Signs











Temperature  98.5 F   04/28/19 14:59


 


Pulse Rate  102 H  04/28/19 13:50


 


Respiratory Rate  20   04/28/19 13:50


 


Blood Pressure  130/81   04/28/19 13:50


 


O2 Sat by Pulse Oximetry (%)  100   04/28/19 13:50











Bleeding: Yes


Bleeding Description: Mild (bloody show)


Headache: No


Visual changes: No


Right upper quadrant pain: No





- Contractions


Contractions: Yes


Regularity: Regular


Intensity: Mod/Strong





- Exam during Labor


Fetal Heart Rate: 150


Variability: Moderate


Category: I


Fetal Monitor Accelerations: Present


Fetal Monitor Decelerations: None


Exam: Vaginal


Dilatation (cm): 7


Effacement (%): 90


Amniotic Membrane Status: Ruptured


Fetal Presentation: Vertex


Fetal Station: 0





- Assessment/Plan


Assessment/Plan: 





Continue current care

## 2019-04-28 NOTE — PN
Delivery





- Delivery


Vaginal Delivery: No Problems


Type of Anesthesia: Epidural


Episiotomy/Laceration: None


EBL (cc): 300





Delivery, Single Birth





- Stages of Labor


Date of Delivery: 19


Date Placenta Delivered: 19


Placenta: Yes: Spontaneous





- Condition of Infant


Pediatrician/Neonatologist Present: No


Infant Gender: Female


Position: Left, OA





-  Feeding Plan


Initial Plan: Exclusive breastfeeding throughout hospitalization





Remarks





- Remarks


Remarks: 





Uncomplicated  of baby girl from JEFFRY position across intact perineum


nuchal cord X 1 noted after delivery of fetal head, reduced at perineum


anterior shoulder (right) delivered with ease along with remainder of 


3vc noted, clamped and cut


placenta delivered in tact spontaneously


no laceration appreciated


sponge count correct


mom stable


baby to well baby nursery

## 2019-04-28 NOTE — HP
Past Medical History





- Admission


Chief Complaint: Painful contractions


History of Present Illness: 





28 y/o P1 female with SIUP at 37+ weeks gestation here with complaints of 

painful contractions since yesterday.  No LOF/VB.  +FM.  Pregnancy complicated 

by pt having AS blood type (sickle cell trait) - FOB also reported positive, 

[facility] done this pregnancy and normal XX fetus with normal blood type noted.  Pt 

with h/o PCOS, obesity, otherwise no other medical conditions. 





Pt has h/o primary  for oligohydramnio and arrest of dilation.


History Source: Medical Record


Limitations to Obtaining History: No Limitations





- Past Medical History


Cardiovascular: No: AFIB, HTN


Pulmonary: No: COPD


Gastrointestinal: No: GERD


Hepatobiliary: No: Hepatitis A, Hepatitis C


Reproductive: Yes: Polycystic Ovary Syndrome


...: 3


...Para: 1


...Term: 1


Heme/Onc: No: Anemia


Infectious Disease: No: HIV, MRSA, STD's


Psych: No: Anxiety, Bipolar, Depression





- Past Surgical History


Past Surgical History: Yes: 


Hx Myomectomy: No


Hx Transabdominal Cerclage: No





- Smoking History


Smoking history: Never smoked


Have you smoked in the past 12 months: No


Aproximately how many cigarettes per day: 0





- Alcohol/Substance Use


Hx Alcohol Use: No





- Social History


Usual Living Arrangement: Yes: With Spouse


ADL: Independent


History of Recent Travel: No





Home Medications





- Allergies


Allergies/Adverse Reactions: 


 Allergies











Allergy/AdvReac Type Severity Reaction Status Date / Time


 


No Known Allergies Allergy   Verified 19 13:57














- Home Medications


Home Medications: 


Ambulatory Orders





Prenatal Vit 108/Iron/Folic AC [Prenatal One Tablet] 1 tab PO DAILY 19 











Review of Systems





- Review of Systems


Constitutional: reports: No Symptoms


Eyes: reports: No Symptoms


HENT: reports: No Symptoms


Neck: reports: No Symptoms


Cardiovascular: reports: No Symptoms


Respiratory: reports: No Symptoms


Gastrointestinal: reports: Abdominal Pain (with contractions)


Genitourinary: denies: Vaginal Bleeding


Breasts: reports: No Symptoms Reported


Musculoskeletal: reports: No Symptoms


Integumentary: reports: No Symptoms


Neurological: reports: No Symptoms


Endocrine: reports: No Symptoms


Hematology/Lymphatic: reports: No Symptoms


Psychiatric: reports: No Symptoms





Physical Exam - Maternity


Constitutional: Yes: Well Nourished, Calm, Mild Distress


Eyes: Yes: EOM Intact


HENT: Yes: Atraumatic


Neck: Yes: Supple


Cardiovascular: Yes: Regular Rate and Rhythm


Lungs: Clear to auscultation


Breast(s): Yes: WNL





- Abdominal Exam/OB


Fundal Height: 38


Number of Fetuses: Single


Fetal Presentation: Vertex


Contractions: Yes


Regularity: Irregular


Intensity: Moderate


Fetal Monitor Mode: External


Fetal Heart Rate (range): 150


Category: I


Accelerations: Uniform


Decelerations: Variable (single isolated)





- Vaginal Exam/OB


Vaginal Bleediing: No


Dilatation (cm): 4.5


Effacement (%): 100


Amniotic Membrane Status: Bulging


Fetal Presentation: Vertex/Position


Fetal Station: -1





- Physical Exam


Psychiatric: Yes: Alert, Oriented





Hemorrhage Risk Assessment





- Risk Factors


Medium Risk Factors: Yes: Prior , uterine surgery,or multiple 

laparotomies


High Risk Factors: Yes: None


Risk Score: 1


Risk Level: Medium Risk





Problem List





- Problems


(1) Uterine scar from previous  delivery


Code(s): O34.219 - MATERNAL CARE FOR UNSP TYPE SCAR FROM PREVIOUS  DEL 

  





(2) Active labor at term


Code(s): XZW7199 -    





Assessment/Plan





28 y/o with SIUP at 37+ weeks, labor, for TOLAC


FHT scat 1


admit to L&D


for epidural, AROM and possibly pitocin


GBs unknown, results not back yet (collected last week in office) will 

prophylactically treat

## 2019-04-29 VITALS — TEMPERATURE: 98.2 F

## 2019-04-29 LAB
BASOPHILS # BLD: 0.2 % (ref 0–2)
DEPRECATED RDW RBC AUTO: 15.6 % (ref 11.6–15.6)
EOSINOPHIL # BLD: 0.4 % (ref 0–4.5)
HCT VFR BLD CALC: 35 % (ref 32.4–45.2)
HGB BLD-MCNC: 11.7 GM/DL (ref 10.7–15.3)
LYMPHOCYTES # BLD: 13 % (ref 8–40)
MCH RBC QN AUTO: 27 PG (ref 25.7–33.7)
MCHC RBC AUTO-ENTMCNC: 33.5 G/DL (ref 32–36)
MCV RBC: 80.5 FL (ref 80–96)
MONOCYTES # BLD AUTO: 7.4 % (ref 3.8–10.2)
NEUTROPHILS # BLD: 79 % (ref 42.8–82.8)
PLATELET # BLD AUTO: 177 K/MM3 (ref 134–434)
PMV BLD: 9.2 FL (ref 7.5–11.1)
RBC # BLD AUTO: 4.35 M/MM3 (ref 3.6–5.2)
WBC # BLD AUTO: 13.3 K/MM3 (ref 4–10)

## 2019-04-29 RX ADMIN — FERROUS SULFATE TAB EC 324 MG (65 MG FE EQUIVALENT) SCH MG: 324 (65 FE) TABLET DELAYED RESPONSE at 11:57

## 2019-04-29 RX ADMIN — IBUPROFEN PRN MG: 600 TABLET, FILM COATED ORAL at 11:57

## 2019-04-29 RX ADMIN — ACETAMINOPHEN PRN MG: 325 TABLET ORAL at 21:26

## 2019-04-29 RX ADMIN — FERROUS SULFATE TAB EC 324 MG (65 MG FE EQUIVALENT) SCH MG: 324 (65 FE) TABLET DELAYED RESPONSE at 17:04

## 2019-04-29 RX ADMIN — FERROUS SULFATE TAB EC 324 MG (65 MG FE EQUIVALENT) SCH MG: 324 (65 FE) TABLET DELAYED RESPONSE at 09:00

## 2019-04-29 RX ADMIN — IBUPROFEN PRN MG: 600 TABLET, FILM COATED ORAL at 21:26

## 2019-04-29 RX ADMIN — ACETAMINOPHEN PRN MG: 325 TABLET ORAL at 11:58

## 2019-04-29 RX ADMIN — Medication SCH TAB: at 09:49

## 2019-04-29 RX ADMIN — ACETAMINOPHEN PRN MG: 325 TABLET ORAL at 05:20

## 2019-04-29 RX ADMIN — IBUPROFEN PRN MG: 600 TABLET, FILM COATED ORAL at 05:19

## 2019-04-29 NOTE — PN
Post Partum Progress Note





- Subjective


Subjective: 





Came to evaluate pt, sleeping upon my arrival.  NO acute events overnight per 

nursing staff. 


Type of Delivery: 


Vital Signs: 


 Vital Signs











Temperature  98.5 F   19 08:31


 


Pulse Rate  101 H  19 08:31


 


Respiratory Rate  18   19 08:31


 


Blood Pressure  129/88   19 08:31


 


O2 Sat by Pulse Oximetry (%)  100   19 19:30











Breast Exam: Yes: Soft


Uterus: Yes: Fundus Firm


Abdomen/GI: Yes: Abdomen soft


Lochia, amount: Small


Extremities: Yes: Calves non-tender.  No: Edema


Perineum: Yes: Intact


Activity: Ambulating





- Labs


Labs: 


 CBC











WBC  13.3 K/mm3 (4.0-10.0)  H  19  06:50    


 


RBC  4.35 M/mm3 (3.60-5.2)   19  06:50    


 


Hgb  11.7 GM/dL (10.7-15.3)   19  06:50    


 


Hct  35.0 % (32.4-45.2)   19  06:50    


 


MCV  80.5 fl (80-96)   19  06:50    


 


MCH  27.0 pg (25.7-33.7)   19  06:50    


 


MCHC  33.5 g/dl (32.0-36.0)   19  06:50    


 


RDW  15.6 % (11.6-15.6)   19  06:50    


 


Plt Count  177 K/MM3 (134-434)   19  06:50    


 


MPV  9.2 fl (7.5-11.1)   19  06:50    


 


Absolute Neuts (auto)  10.5 K/mm3 (1.5-8.0)  H  19  06:50    


 


Neutrophils %  79.0 % (42.8-82.8)   19  06:50    


 


Lymphocytes %  13.0 % (8-40)   19  06:50    


 


Monocytes %  7.4 % (3.8-10.2)   19  06:50    


 


Eosinophils %  0.4 % (0-4.5)   19  06:50    


 


Basophils %  0.2 % (0-2.0)   19  06:50    


 


Nucleated RBC %  0 % (0-0)   19  06:50    














Problem List





- Problems


(1) Uterine scar from previous  delivery


Code(s): O34.219 - MATERNAL CARE FOR UNSP TYPE SCAR FROM PREVIOUS  DEL 

  





(2) Active labor at term


Code(s): XCB9090 -    





(3) Vaginal birth after  ()


Code(s): O34.219 - MATERNAL CARE FOR UNSP TYPE SCAR FROM PREVIOUS  DEL 

  





Assessment/Plan





Pt doing well


Regular diet


encourage ambulation


PO pain meds


Hgb 11.7


routine care

## 2019-04-30 VITALS — SYSTOLIC BLOOD PRESSURE: 136 MMHG | HEART RATE: 83 BPM | DIASTOLIC BLOOD PRESSURE: 88 MMHG

## 2019-04-30 RX ADMIN — IBUPROFEN PRN MG: 600 TABLET, FILM COATED ORAL at 07:37

## 2019-04-30 RX ADMIN — FERROUS SULFATE TAB EC 324 MG (65 MG FE EQUIVALENT) SCH MG: 324 (65 FE) TABLET DELAYED RESPONSE at 12:49

## 2019-04-30 RX ADMIN — ACETAMINOPHEN PRN MG: 325 TABLET ORAL at 07:36

## 2019-04-30 RX ADMIN — BUPIVACAINE HYDROCHLORIDE SCH: 7.5 INJECTION, SOLUTION EPIDURAL; RETROBULBAR at 07:08

## 2019-04-30 RX ADMIN — FERROUS SULFATE TAB EC 324 MG (65 MG FE EQUIVALENT) SCH MG: 324 (65 FE) TABLET DELAYED RESPONSE at 07:36

## 2019-04-30 RX ADMIN — Medication SCH TAB: at 09:58

## 2019-04-30 NOTE — DS
Physical Exam-GYN


Vital Signs: 


 Vital Signs











Temperature  98.2 F   19 21:29


 


Pulse Rate  100 H  19 21:29


 


Respiratory Rate  20   19 21:29


 


Blood Pressure  127/76   19 21:29


 


O2 Sat by Pulse Oximetry (%)  100   19 19:30











Labs: 


 CBC, BMP





 19 06:50 





 19 11:25 











Delivery





- Delivery


Vaginal Delivery: No Problems


Type of Anesthesia: Epidural


Episiotomy/Laceration: None


EBL (cc): 300





Delivery, Single Birth





- Stages of Labor


Date 1st Stage Initiatied: 19


Time 1st Stage Initiated: 23:30


Date 2nd Stage Initiated: 19


Time 2nd Stage Initiated: 17:30


Date of Delivery: 19


Time of Delivery: 17:46


Time Placenta Delivered: 17:53


Placenta: Yes: Spontaneous





- Condition of Infant


Pediatrician/Neonatologist Present: No


Infant Gender: Female


Birth Weight: 6 lb 7 oz


Position: Left, OA


Total Hours ROM (Hrs/Mins): 5hr 36min





- Apgar


  ** 1 Minute


Apgar Total Score: 9





  ** 5 Minutes


Apgar Total Score: 9





-  Feeding Plan


Initial Plan: Exclusive breastfeeding throughout hospitalization





Discharge Summary


Reason For Visit: LABOR


Current Active Problems





Active labor at term (Acute)


Uterine scar from previous  delivery (Acute)


Vaginal birth after  () (Acute)








Hospital Course: 





PT admitted on  in labor, has h/o prior  delivery, desired TOLAC.  

She underwent an uncomplicated / on that same date.  She had an 

uncomplicated post partum recovery and was discharged home on post partum day 

2. 


Condition: Good





- Instructions


Diet, Activity, Other Instructions: 





Physical activity





Resume your normal everyday activity as tolerated no heavy lifting or strenuous 

exercise until seen by your doctor. You may walk unlimited amounts and climb 

stairs. You may resume driving the car when you feel safe and comfortable 

behind the wheel. No sexual activity as instructed for 6 weeks.  You may shower 

daily, no soaking in tubs/baths/pools for 4 weeks. 





Diet


There are no dietary restrictions. Eat healthy, high-fiber foods. Drink 6 to 8 

glasses of liquid each day. This will assist in keeping your bowels are regular.





Pain management


You may take Tylenol  or Ibuprofen (for example, Motrin, Advil etc.) as needed 

for pain. 





Call MD for any of the following:





Severe pain not relieved by medication


Fever of 101 or higher


Excessive bleeding or drainage on dressing


Inability to urinate


Referrals: 


Geetha Weiss DO [Staff Physician] -  (6 weeks)


Disposition: HOME





- Home Medications


Comprehensive Discharge Medication List: 


Ambulatory Orders





Prenatal Vit 108/Iron/Folic AC [Prenatal One Tablet] 1 tab PO DAILY 19

## 2021-03-29 ENCOUNTER — HOSPITAL ENCOUNTER (INPATIENT)
Dept: HOSPITAL 74 - JLDR | Age: 31
LOS: 3 days | Discharge: HOME | DRG: 540 | End: 2021-04-01
Attending: OBSTETRICS & GYNECOLOGY | Admitting: OBSTETRICS & GYNECOLOGY
Payer: COMMERCIAL

## 2021-03-29 VITALS — BODY MASS INDEX: 34.5 KG/M2

## 2021-03-29 DIAGNOSIS — O36.5930: ICD-10-CM

## 2021-03-29 DIAGNOSIS — L29.8: ICD-10-CM

## 2021-03-29 DIAGNOSIS — O34.219: ICD-10-CM

## 2021-03-29 DIAGNOSIS — Z3A.37: ICD-10-CM

## 2021-03-29 DIAGNOSIS — O41.03X0: Primary | ICD-10-CM

## 2021-03-29 RX ADMIN — ACETAMINOPHEN PRN MG: 10 INJECTION, SOLUTION INTRAVENOUS at 20:04

## 2021-03-29 RX ADMIN — ACETAMINOPHEN PRN MG: 10 INJECTION, SOLUTION INTRAVENOUS at 12:45

## 2021-03-30 LAB
BASOPHILS # BLD: 0.2 % (ref 0–2)
DEPRECATED RDW RBC AUTO: 15.8 % (ref 11.6–15.6)
EOSINOPHIL # BLD: 1.2 % (ref 0–4.5)
HCT VFR BLD CALC: 32 % (ref 32.4–45.2)
HGB BLD-MCNC: 10.6 GM/DL (ref 10.7–15.3)
LYMPHOCYTES # BLD: 13.8 % (ref 8–40)
MCH RBC QN AUTO: 26.9 PG (ref 25.7–33.7)
MCHC RBC AUTO-ENTMCNC: 33.2 G/DL (ref 32–36)
MCV RBC: 81.2 FL (ref 80–96)
MONOCYTES # BLD AUTO: 6.9 % (ref 3.8–10.2)
NEUTROPHILS # BLD: 77.9 % (ref 42.8–82.8)
PLATELET # BLD AUTO: 177 K/MM3 (ref 134–434)
PMV BLD: 9.4 FL (ref 7.5–11.1)
RBC # BLD AUTO: 3.95 M/MM3 (ref 3.6–5.2)
WBC # BLD AUTO: 10.1 K/MM3 (ref 4–10)

## 2021-03-30 RX ADMIN — ACETAMINOPHEN PRN MG: 325 TABLET ORAL at 16:37

## 2021-03-30 RX ADMIN — ACETAMINOPHEN PRN MG: 10 INJECTION, SOLUTION INTRAVENOUS at 07:52

## 2021-03-30 RX ADMIN — IBUPROFEN PRN MG: 600 TABLET, FILM COATED ORAL at 16:37

## 2021-03-30 RX ADMIN — IBUPROFEN PRN MG: 600 TABLET, FILM COATED ORAL at 11:49

## 2021-03-30 RX ADMIN — DIPHENHYDRAMINE HCL PRN MG: 25 CAPSULE ORAL at 16:37

## 2021-03-30 RX ADMIN — SIMETHICONE CHEW TAB 80 MG PRN MG: 80 TABLET ORAL at 21:17

## 2021-03-30 RX ADMIN — IBUPROFEN PRN MG: 600 TABLET, FILM COATED ORAL at 21:18

## 2021-03-30 RX ADMIN — DIPHENHYDRAMINE HCL PRN MG: 25 CAPSULE ORAL at 10:22

## 2021-03-30 RX ADMIN — DOCUSATE SODIUM,SENNOSIDES PRN TABLET: 50; 8.6 TABLET, FILM COATED ORAL at 21:17

## 2021-03-30 RX ADMIN — Medication SCH TAB: at 10:22

## 2021-03-30 RX ADMIN — ACETAMINOPHEN PRN MG: 325 TABLET ORAL at 21:18

## 2021-03-31 RX ADMIN — IBUPROFEN PRN MG: 600 TABLET, FILM COATED ORAL at 20:24

## 2021-03-31 RX ADMIN — IBUPROFEN PRN MG: 600 TABLET, FILM COATED ORAL at 03:27

## 2021-03-31 RX ADMIN — ACETAMINOPHEN PRN MG: 325 TABLET ORAL at 03:27

## 2021-03-31 RX ADMIN — SIMETHICONE CHEW TAB 80 MG PRN MG: 80 TABLET ORAL at 07:20

## 2021-03-31 RX ADMIN — SIMETHICONE CHEW TAB 80 MG PRN MG: 80 TABLET ORAL at 20:23

## 2021-03-31 RX ADMIN — ACETAMINOPHEN PRN MG: 325 TABLET ORAL at 09:40

## 2021-03-31 RX ADMIN — DOCUSATE SODIUM,SENNOSIDES PRN TABLET: 50; 8.6 TABLET, FILM COATED ORAL at 20:25

## 2021-03-31 RX ADMIN — IBUPROFEN PRN MG: 600 TABLET, FILM COATED ORAL at 15:08

## 2021-03-31 RX ADMIN — SIMETHICONE CHEW TAB 80 MG PRN MG: 80 TABLET ORAL at 03:28

## 2021-03-31 RX ADMIN — IBUPROFEN PRN MG: 600 TABLET, FILM COATED ORAL at 07:20

## 2021-03-31 RX ADMIN — SIMETHICONE CHEW TAB 80 MG PRN MG: 80 TABLET ORAL at 15:07

## 2021-03-31 RX ADMIN — Medication SCH TAB: at 09:38

## 2021-03-31 RX ADMIN — ACETAMINOPHEN PRN MG: 325 TABLET ORAL at 15:09

## 2021-03-31 RX ADMIN — ACETAMINOPHEN PRN MG: 325 TABLET ORAL at 20:23

## 2021-04-01 VITALS — DIASTOLIC BLOOD PRESSURE: 74 MMHG | HEART RATE: 97 BPM | SYSTOLIC BLOOD PRESSURE: 136 MMHG | TEMPERATURE: 97.9 F

## 2021-04-01 RX ADMIN — ACETAMINOPHEN PRN MG: 325 TABLET ORAL at 01:21

## 2021-04-01 RX ADMIN — SIMETHICONE CHEW TAB 80 MG PRN MG: 80 TABLET ORAL at 13:43

## 2021-04-01 RX ADMIN — ACETAMINOPHEN PRN MG: 325 TABLET ORAL at 13:43

## 2021-04-01 RX ADMIN — IBUPROFEN PRN MG: 600 TABLET, FILM COATED ORAL at 07:40

## 2021-04-01 RX ADMIN — SIMETHICONE CHEW TAB 80 MG PRN MG: 80 TABLET ORAL at 07:41

## 2021-04-01 RX ADMIN — IBUPROFEN PRN MG: 600 TABLET, FILM COATED ORAL at 13:43

## 2021-04-01 RX ADMIN — Medication SCH TAB: at 10:25

## 2021-04-01 RX ADMIN — IBUPROFEN PRN MG: 600 TABLET, FILM COATED ORAL at 01:21

## 2021-04-01 RX ADMIN — ACETAMINOPHEN PRN MG: 325 TABLET ORAL at 07:41

## 2021-05-14 ENCOUNTER — HOSPITAL ENCOUNTER (EMERGENCY)
Dept: HOSPITAL 74 - JER | Age: 31
Discharge: HOME | End: 2021-05-14
Payer: COMMERCIAL

## 2021-05-14 VITALS — DIASTOLIC BLOOD PRESSURE: 75 MMHG | SYSTOLIC BLOOD PRESSURE: 139 MMHG | HEART RATE: 71 BPM

## 2021-05-14 VITALS — TEMPERATURE: 98.4 F

## 2021-05-14 VITALS — BODY MASS INDEX: 29.9 KG/M2

## 2021-05-14 DIAGNOSIS — R10.13: Primary | ICD-10-CM

## 2021-05-14 LAB
ALBUMIN SERPL-MCNC: 3.8 G/DL (ref 3.4–5)
ALP SERPL-CCNC: 114 U/L (ref 45–117)
ALT SERPL-CCNC: 23 U/L (ref 13–61)
ANION GAP SERPL CALC-SCNC: 5 MMOL/L (ref 8–16)
APPEARANCE UR: CLEAR
APTT BLD: 29.2 SECONDS (ref 25.2–36.5)
AST SERPL-CCNC: 25 U/L (ref 15–37)
BACTERIA # UR AUTO: 137 /UL (ref 0–1359)
BASOPHILS # BLD: 0.7 % (ref 0–2)
BILIRUB SERPL-MCNC: 0.6 MG/DL (ref 0.2–1)
BILIRUB UR STRIP.AUTO-MCNC: NEGATIVE MG/DL
BUN SERPL-MCNC: 12 MG/DL (ref 7–18)
CALCIUM SERPL-MCNC: 9.1 MG/DL (ref 8.5–10.1)
CASTS URNS QL MICRO: 0 /UL (ref 0–3.1)
CHLORIDE SERPL-SCNC: 106 MMOL/L (ref 98–107)
CO2 SERPL-SCNC: 29 MMOL/L (ref 21–32)
COLOR UR: YELLOW
CREAT SERPL-MCNC: 0.7 MG/DL (ref 0.55–1.3)
DEPRECATED RDW RBC AUTO: 15.2 % (ref 11.6–15.6)
EOSINOPHIL # BLD: 1.8 % (ref 0–4.5)
EPITH CASTS URNS QL MICRO: 7 /UL (ref 0–25.1)
GLUCOSE SERPL-MCNC: 86 MG/DL (ref 74–106)
HCT VFR BLD CALC: 37.2 % (ref 32.4–45.2)
HGB BLD-MCNC: 12.3 GM/DL (ref 10.7–15.3)
INR BLD: 1 (ref 0.83–1.09)
KETONES UR QL STRIP: NEGATIVE
LEUKOCYTE ESTERASE UR QL STRIP.AUTO: NEGATIVE
LIPASE SERPL-CCNC: 128 U/L (ref 73–393)
LYMPHOCYTES # BLD: 30.9 % (ref 8–40)
MAGNESIUM SERPL-MCNC: 1.9 MG/DL (ref 1.8–2.4)
MCH RBC QN AUTO: 25.8 PG (ref 25.7–33.7)
MCHC RBC AUTO-ENTMCNC: 33 G/DL (ref 32–36)
MCV RBC: 78.1 FL (ref 80–96)
MONOCYTES # BLD AUTO: 7.4 % (ref 3.8–10.2)
NEUTROPHILS # BLD: 59.2 % (ref 42.8–82.8)
NITRITE UR QL STRIP: NEGATIVE
PH UR: 7 [PH] (ref 5–8)
PLATELET # BLD AUTO: 246 K/MM3 (ref 134–434)
PMV BLD: 9.4 FL (ref 7.5–11.1)
PROT SERPL-MCNC: 7.7 G/DL (ref 6.4–8.2)
PROT UR QL STRIP: NEGATIVE
PROT UR QL STRIP: NEGATIVE
PT PNL PPP: 12.1 SEC (ref 9.7–13)
RBC # BLD AUTO: 4.76 M/MM3 (ref 3.6–5.2)
RBC # BLD AUTO: 8 /UL (ref 0–23.9)
SODIUM SERPL-SCNC: 140 MMOL/L (ref 136–145)
SP GR UR: 1.01 (ref 1.01–1.03)
UROBILINOGEN UR STRIP-MCNC: 1 MG/DL (ref 0.2–1)
WBC # BLD AUTO: 9.7 K/MM3 (ref 4–10)
WBC # UR AUTO: 3 /UL (ref 0–25.8)

## 2021-05-14 PROCEDURE — 3E033GC INTRODUCTION OF OTHER THERAPEUTIC SUBSTANCE INTO PERIPHERAL VEIN, PERCUTANEOUS APPROACH: ICD-10-PCS

## 2021-12-06 ENCOUNTER — HOSPITAL ENCOUNTER (EMERGENCY)
Dept: HOSPITAL 74 - JERFT | Age: 31
Discharge: HOME | End: 2021-12-06
Payer: COMMERCIAL

## 2021-12-06 VITALS — BODY MASS INDEX: 32.4 KG/M2

## 2021-12-06 VITALS — DIASTOLIC BLOOD PRESSURE: 83 MMHG | HEART RATE: 73 BPM | SYSTOLIC BLOOD PRESSURE: 117 MMHG | TEMPERATURE: 98.2 F

## 2021-12-06 DIAGNOSIS — R51.9: Primary | ICD-10-CM

## 2021-12-06 LAB
ALBUMIN SERPL-MCNC: 3.6 G/DL (ref 3.4–5)
ALP SERPL-CCNC: 78 U/L (ref 45–117)
ALT SERPL-CCNC: 25 U/L (ref 13–61)
ANION GAP SERPL CALC-SCNC: 9 MMOL/L (ref 8–16)
AST SERPL-CCNC: 22 U/L (ref 15–37)
BASOPHILS # BLD: 0.7 % (ref 0–2)
BILIRUB SERPL-MCNC: 0.6 MG/DL (ref 0.2–1)
BUN SERPL-MCNC: 10.2 MG/DL (ref 7–18)
CALCIUM SERPL-MCNC: 8.9 MG/DL (ref 8.5–10.1)
CHLORIDE SERPL-SCNC: 111 MMOL/L (ref 98–107)
CO2 SERPL-SCNC: 20 MMOL/L (ref 21–32)
CREAT SERPL-MCNC: 0.8 MG/DL (ref 0.55–1.3)
DEPRECATED RDW RBC AUTO: 14.5 % (ref 11.6–15.6)
EOSINOPHIL # BLD: 1.9 % (ref 0–4.5)
GLUCOSE SERPL-MCNC: 110 MG/DL (ref 74–106)
HCT VFR BLD CALC: 39.5 % (ref 32.4–45.2)
HGB BLD-MCNC: 13.2 GM/DL (ref 10.7–15.3)
LYMPHOCYTES # BLD: 35.8 % (ref 8–40)
MCH RBC QN AUTO: 27.1 PG (ref 25.7–33.7)
MCHC RBC AUTO-ENTMCNC: 33.4 G/DL (ref 32–36)
MCV RBC: 81.2 FL (ref 80–96)
MONOCYTES # BLD AUTO: 4.4 % (ref 3.8–10.2)
NEUTROPHILS # BLD: 57.2 % (ref 42.8–82.8)
PLATELET # BLD AUTO: 362 10^3/UL (ref 134–434)
PMV BLD: 8.8 FL (ref 7.5–11.1)
PROT SERPL-MCNC: 8 G/DL (ref 6.4–8.2)
RBC # BLD AUTO: 4.87 M/MM3 (ref 3.6–5.2)
SODIUM SERPL-SCNC: 140 MMOL/L (ref 136–145)
WBC # BLD AUTO: 10.8 K/MM3 (ref 4–10)

## 2021-12-06 PROCEDURE — 3E033GC INTRODUCTION OF OTHER THERAPEUTIC SUBSTANCE INTO PERIPHERAL VEIN, PERCUTANEOUS APPROACH: ICD-10-PCS

## 2021-12-06 PROCEDURE — 3E0337Z INTRODUCTION OF ELECTROLYTIC AND WATER BALANCE SUBSTANCE INTO PERIPHERAL VEIN, PERCUTANEOUS APPROACH: ICD-10-PCS

## 2021-12-06 PROCEDURE — 3E0333Z INTRODUCTION OF ANTI-INFLAMMATORY INTO PERIPHERAL VEIN, PERCUTANEOUS APPROACH: ICD-10-PCS

## 2022-09-13 ENCOUNTER — HOSPITAL ENCOUNTER (EMERGENCY)
Dept: HOSPITAL 74 - JERFT | Age: 32
Discharge: HOME | End: 2022-09-13
Payer: COMMERCIAL

## 2022-09-13 VITALS
SYSTOLIC BLOOD PRESSURE: 139 MMHG | RESPIRATION RATE: 18 BRPM | DIASTOLIC BLOOD PRESSURE: 84 MMHG | TEMPERATURE: 98.1 F | HEART RATE: 100 BPM

## 2022-09-13 VITALS — BODY MASS INDEX: 33.9 KG/M2

## 2022-09-13 DIAGNOSIS — R10.13: Primary | ICD-10-CM

## 2022-09-13 DIAGNOSIS — G43.909: ICD-10-CM

## 2022-09-13 LAB
ALBUMIN SERPL-MCNC: 4 G/DL (ref 3.4–5)
ALP SERPL-CCNC: 65 U/L (ref 45–117)
ALT SERPL-CCNC: 32 U/L (ref 13–61)
ANION GAP SERPL CALC-SCNC: 7 MMOL/L (ref 8–16)
AST SERPL-CCNC: 21 U/L (ref 15–37)
BASOPHILS # BLD: 0.3 % (ref 0–2)
BILIRUB SERPL-MCNC: 0.7 MG/DL (ref 0.2–1)
BUN SERPL-MCNC: 12.4 MG/DL (ref 7–18)
CALCIUM SERPL-MCNC: 9.7 MG/DL (ref 8.5–10.1)
CHLORIDE SERPL-SCNC: 106 MMOL/L (ref 98–107)
CO2 SERPL-SCNC: 26 MMOL/L (ref 21–32)
CREAT SERPL-MCNC: 0.7 MG/DL (ref 0.55–1.3)
DEPRECATED RDW RBC AUTO: 13.9 % (ref 11.6–15.6)
EOSINOPHIL # BLD: 1.1 % (ref 0–4.5)
GLUCOSE SERPL-MCNC: 108 MG/DL (ref 74–106)
HCT VFR BLD CALC: 41.2 % (ref 32.4–45.2)
HGB BLD-MCNC: 13.4 GM/DL (ref 10.7–15.3)
LIPASE SERPL-CCNC: 85 U/L (ref 73–393)
LYMPHOCYTES # BLD: 25.9 % (ref 8–40)
MCH RBC QN AUTO: 26.8 PG (ref 25.7–33.7)
MCHC RBC AUTO-ENTMCNC: 32.5 G/DL (ref 32–36)
MCV RBC: 82.4 FL (ref 80–96)
MONOCYTES # BLD AUTO: 7.1 % (ref 3.8–10.2)
NEUTROPHILS # BLD: 65.6 % (ref 42.8–82.8)
PLATELET # BLD AUTO: 292 10^3/UL (ref 134–434)
PMV BLD: 9.3 FL (ref 7.5–11.1)
PROT SERPL-MCNC: 7.6 G/DL (ref 6.4–8.2)
RBC # BLD AUTO: 5 M/MM3 (ref 3.6–5.2)
SODIUM SERPL-SCNC: 140 MMOL/L (ref 136–145)
WBC # BLD AUTO: 11 K/MM3 (ref 4–10)

## 2022-09-13 PROCEDURE — 3E0333Z INTRODUCTION OF ANTI-INFLAMMATORY INTO PERIPHERAL VEIN, PERCUTANEOUS APPROACH: ICD-10-PCS

## 2022-09-13 PROCEDURE — 3E033GC INTRODUCTION OF OTHER THERAPEUTIC SUBSTANCE INTO PERIPHERAL VEIN, PERCUTANEOUS APPROACH: ICD-10-PCS

## 2022-09-13 PROCEDURE — 3E0337Z INTRODUCTION OF ELECTROLYTIC AND WATER BALANCE SUBSTANCE INTO PERIPHERAL VEIN, PERCUTANEOUS APPROACH: ICD-10-PCS

## 2023-02-02 ENCOUNTER — HOSPITAL ENCOUNTER (EMERGENCY)
Dept: HOSPITAL 74 - JER | Age: 33
Discharge: HOME | End: 2023-02-02
Payer: COMMERCIAL

## 2023-02-02 ENCOUNTER — OFFICE (OUTPATIENT)
Dept: URBAN - METROPOLITAN AREA CLINIC 29 | Facility: CLINIC | Age: 33
Setting detail: OPHTHALMOLOGY
End: 2023-02-02
Payer: MEDICAID

## 2023-02-02 VITALS
HEART RATE: 88 BPM | RESPIRATION RATE: 16 BRPM | TEMPERATURE: 97.9 F | DIASTOLIC BLOOD PRESSURE: 77 MMHG | SYSTOLIC BLOOD PRESSURE: 126 MMHG

## 2023-02-02 VITALS — BODY MASS INDEX: 33.9 KG/M2

## 2023-02-02 DIAGNOSIS — H57.13: ICD-10-CM

## 2023-02-02 DIAGNOSIS — R51.9: Primary | ICD-10-CM

## 2023-02-02 DIAGNOSIS — H52.13: ICD-10-CM

## 2023-02-02 DIAGNOSIS — H52.213: ICD-10-CM

## 2023-02-02 DIAGNOSIS — H18.603: ICD-10-CM

## 2023-02-02 DIAGNOSIS — H35.463: ICD-10-CM

## 2023-02-02 PROCEDURE — 92015 DETERMINE REFRACTIVE STATE: CPT | Performed by: OPHTHALMOLOGY

## 2023-02-02 PROCEDURE — 92250 FUNDUS PHOTOGRAPHY W/I&R: CPT | Performed by: OPHTHALMOLOGY

## 2023-02-02 PROCEDURE — 92025 CPTRIZED CORNEAL TOPOGRAPHY: CPT | Performed by: OPHTHALMOLOGY

## 2023-02-02 PROCEDURE — 92014 COMPRE OPH EXAM EST PT 1/>: CPT | Performed by: OPHTHALMOLOGY

## 2023-02-02 ASSESSMENT — REFRACTION_CURRENTRX
OD_OVR_VA: 20/
OD_AXIS: 145
OD_CYLINDER: +1.75
OS_AXIS: 40
OS_OVR_VA: 20/
OD_SPHERE: -7.00
OS_CYLINDER: +1.75
OS_AXIS: 37
OD_CYLINDER: +2.00
OS_SPHERE: -6.25
OD_AXIS: 147
OD_SPHERE: -7.50
OS_CYLINDER: +1.50
OS_SPHERE: -6.75
OS_OVR_VA: 20/
OD_OVR_VA: 20/

## 2023-02-02 ASSESSMENT — REFRACTION_AUTOREFRACTION
OD_CYLINDER: +4.50
OS_AXIS: 55
OD_SPHERE: -8.00
OD_AXIS: 140
OS_CYLINDER: +8.50
OS_SPHERE: -13.25

## 2023-02-02 ASSESSMENT — TONOMETRY
OS_IOP_MMHG: 12
OD_IOP_MMHG: 12

## 2023-02-02 ASSESSMENT — SPHEQUIV_DERIVED
OD_SPHEQUIV: -5.75
OS_SPHEQUIV: -5.5
OD_SPHEQUIV: -6.125
OS_SPHEQUIV: -9

## 2023-02-02 ASSESSMENT — KERATOMETRY
OS_K2POWER_DIOPTERS: 44.25
OD_K2POWER_DIOPTERS: 43.50
OS_AXISANGLE_DEGREES: 47
OD_K1POWER_DIOPTERS: 44.75
OD_AXISANGLE_DEGREES: 136
OS_K1POWER_DIOPTERS: 46.50

## 2023-02-02 ASSESSMENT — CONFRONTATIONAL VISUAL FIELD TEST (CVF)
OS_FINDINGS: FULL
OD_FINDINGS: FULL

## 2023-02-02 ASSESSMENT — REFRACTION_MANIFEST
OD_CYLINDER: +2.75
OS_AXIS: 050
OS_CYLINDER: +2.00
OS_VA1: 20/80
OD_AXIS: 140
OD_VA1: 20/50
OS_SPHERE: -6.50
OD_SPHERE: -7.50

## 2023-02-02 ASSESSMENT — AXIALLENGTH_DERIVED
OD_AL: 25.7899
OS_AL: 25.1415
OD_AL: 25.9657
OS_AL: 26.7918

## 2023-02-02 ASSESSMENT — VISUAL ACUITY
OD_BCVA: 20/70-3
OS_BCVA: 20/60-2

## 2023-07-11 ENCOUNTER — HOSPITAL ENCOUNTER (OUTPATIENT)
Dept: HOSPITAL 74 - JASU-SURG | Age: 33
Discharge: HOME | End: 2023-07-11
Attending: SURGERY
Payer: COMMERCIAL

## 2023-07-11 VITALS
TEMPERATURE: 98.2 F | DIASTOLIC BLOOD PRESSURE: 68 MMHG | RESPIRATION RATE: 18 BRPM | HEART RATE: 74 BPM | SYSTOLIC BLOOD PRESSURE: 120 MMHG

## 2023-07-11 VITALS — BODY MASS INDEX: 33.9 KG/M2

## 2023-07-11 DIAGNOSIS — K80.10: Primary | ICD-10-CM

## 2023-07-11 PROCEDURE — 0FT44ZZ RESECTION OF GALLBLADDER, PERCUTANEOUS ENDOSCOPIC APPROACH: ICD-10-PCS | Performed by: SURGERY

## 2024-03-01 ENCOUNTER — OFFICE (OUTPATIENT)
Dept: URBAN - METROPOLITAN AREA CLINIC 29 | Facility: CLINIC | Age: 34
Setting detail: OPHTHALMOLOGY
End: 2024-03-01
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
## 2024-03-01 PROCEDURE — NO SHOW FE NO SHOW FEE: Performed by: OPHTHALMOLOGY
